# Patient Record
Sex: MALE | URBAN - METROPOLITAN AREA
[De-identification: names, ages, dates, MRNs, and addresses within clinical notes are randomized per-mention and may not be internally consistent; named-entity substitution may affect disease eponyms.]

---

## 2022-10-10 ENCOUNTER — APPOINTMENT (RX ONLY)
Dept: URBAN - METROPOLITAN AREA CLINIC 41 | Facility: CLINIC | Age: 67
Setting detail: DERMATOLOGY
End: 2022-10-10

## 2022-10-10 DIAGNOSIS — Z41.9 ENCOUNTER FOR PROCEDURE FOR PURPOSES OTHER THAN REMEDYING HEALTH STATE, UNSPECIFIED: ICD-10-CM

## 2022-10-10 PROCEDURE — ? FILLERS

## 2022-10-10 PROCEDURE — ? COSMETIC CONSULTATION: FILLERS

## 2022-10-10 PROCEDURE — ? ADDITIONAL NOTES

## 2022-10-10 NOTE — PROCEDURE: ADDITIONAL NOTES
Detail Level: Simple
Render Risk Assessment In Note?: no
Additional Notes: Discussed filler to cheeks instead of nasolabial folds.\\nDiscussed starting with marionette lines at 1 syringe per side 800/syringe then coming back to treat cheeks.

## 2022-10-10 NOTE — PROCEDURE: FILLERS
Additional Area 5 Volume In Cc: 0
Include Cannula Information In Note?: No
Expiration Date (Month Year): 2022-09-03
Anesthesia Type: 1% lidocaine with epinephrine
Lot #: [id number]
Marionette Lines Filler Volume In Cc: 1
Anesthesia Volume In Cc: 0.5
Expiration Date (Month Year): 2024-05-31
Additional Anesthesia Volume In Cc: 6
Detail Level: Detailed
Filler: Remy Kumar
Map Statment: See Attach Map for Complete Details
Post-Care Instructions: Patient instructed to apply ice to reduce swelling.
Consent: Written consent obtained. Risks include but not limited to bruising, beading, irregular texture, ulceration, infection, allergic reaction, scar formation, incomplete augmentation, temporary nature, procedural pain.
Filler: Restylane Silk
Aspiration Statement: Aspiration was performed prior to injecting site with filler.
Lot #: [id number]